# Patient Record
Sex: FEMALE | URBAN - METROPOLITAN AREA
[De-identification: names, ages, dates, MRNs, and addresses within clinical notes are randomized per-mention and may not be internally consistent; named-entity substitution may affect disease eponyms.]

---

## 2020-10-15 ENCOUNTER — OFFICE (OUTPATIENT)
Dept: URBAN - METROPOLITAN AREA CLINIC 34 | Facility: CLINIC | Age: 30
End: 2020-10-15
Payer: MEDICAID

## 2020-10-15 VITALS
HEART RATE: 70 BPM | TEMPERATURE: 97.5 F | DIASTOLIC BLOOD PRESSURE: 77 MMHG | SYSTOLIC BLOOD PRESSURE: 114 MMHG | WEIGHT: 182.4 LBS | HEIGHT: 65 IN

## 2020-10-15 DIAGNOSIS — R10.13 EPIGASTRIC PAIN: ICD-10-CM

## 2020-10-15 PROCEDURE — 99204 OFFICE O/P NEW MOD 45 MIN: CPT | Performed by: NURSE PRACTITIONER

## 2020-10-15 RX ORDER — PANTOPRAZOLE SODIUM 40 MG/1
TABLET, DELAYED RELEASE ORAL
Qty: 30 | Refills: 5 | Status: ACTIVE
Start: 2020-10-15

## 2020-10-15 NOTE — SERVICEHPINOTES
LENIN COMBS   is a   30  female who presents with stomach pain. Has been experiencing epigastric pain since a couple of months ago. The pain is worse on empty stomach. Eating helps for a couple of hours. Has pain all night. The pain is getting worse every day. Denies nausea, vomiting, dysphagia, acid reflux or weight loss. Denies taking NSAIDs. BRMoves bowel daily. Denies blood in stool, melena, constipation or diarrhea. Occasional lower abdominal pain that only lasts for a few seconds.   BRTried TUMS but it did not work.Denies family hx of stomach cancer or colon cancer. Denies chest pain, palpitations or sob with exertion. BRDenies significant medical or surgical history.

## 2025-07-22 ENCOUNTER — APPOINTMENT (OUTPATIENT)
Dept: URBAN - METROPOLITAN AREA CLINIC 40 | Facility: CLINIC | Age: 35
Setting detail: DERMATOLOGY
End: 2025-07-22

## 2025-07-22 DIAGNOSIS — L65.0 TELOGEN EFFLUVIUM: ICD-10-CM | Status: INADEQUATELY CONTROLLED

## 2025-07-22 PROCEDURE — ? COUNSELING

## 2025-07-22 PROCEDURE — ? TREATMENT REGIMEN

## 2025-07-22 PROCEDURE — ? DIAGNOSIS COMMENT

## 2025-07-22 ASSESSMENT — LOCATION DETAILED DESCRIPTION DERM: LOCATION DETAILED: MEDIAL FRONTAL SCALP

## 2025-07-22 ASSESSMENT — LOCATION ZONE DERM: LOCATION ZONE: SCALP

## 2025-07-22 ASSESSMENT — LOCATION SIMPLE DESCRIPTION DERM: LOCATION SIMPLE: FRONTAL SCALP

## 2025-07-22 NOTE — HPI: HAIR LOSS
Previous Labs: Yes
How Did The Hair Loss Occur?: sudden in onset
How Severe Is Your Hair Loss?: moderate
When Were The Labs Drawn? (Drawn...): [facility]
Lab Details: Thyroid, Vitamin D, CBC, Iron level, Potassium
What Hair Products Do You Use?: Ebony

## 2025-07-22 NOTE — PROCEDURE: DIAGNOSIS COMMENT
Comment: 3-4w duration.  Denies recent illnesses/fevers, procedures in the past few months.  Had been on Zepbound x 3 yrs and d/c'd on her own in case it was causing the shedding.  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  Will not proceed w/ additional infusions, stress may have also been a trigger.  Labs WNL apart from low iron, has been supplementing.  Uterine ablation last year.  
Render Risk Assessment In Note?: no
Detail Level: Simple